# Patient Record
Sex: FEMALE | Race: WHITE | NOT HISPANIC OR LATINO | Employment: FULL TIME | URBAN - METROPOLITAN AREA
[De-identification: names, ages, dates, MRNs, and addresses within clinical notes are randomized per-mention and may not be internally consistent; named-entity substitution may affect disease eponyms.]

---

## 2024-02-08 NOTE — PROGRESS NOTES
Subjective   Patient ID:   [id number]   Sepideh Monroy is a 48 y.o. female who presents for Food Allergy (NPV, throat pain  and tongue swells when pt eats certain foods ).    Chief Complaint   Patient presents with    Food Allergy     NPV, throat pain  and tongue swells when pt eats certain foods         This is a new patient presenting virtually from home, unaccompanied, for tongue and throat edema.    Patient reports within about 8 months, after she consumed cooked squash, pumpkin and zucchini, and her throat felt funny.  She subsequently consumed cooked eggplant and green and red peppers and had similar Sx.  She also had issues with cucumbers and washes all her produce with vinegar.  She basically eat s lettuce and cheese because she is unsure of what else she can eat.    Patient has taken Benadryl longstanding for Sx with shellfish for several years.  She now fears she may progress to anaphylaxis.  Patient had COVID when it first started, then had the vaccine and feels her Sx may have started since then.  She was sick for a month but did not get tested.  She did experience anosmia and dysgeusia for 2-3 months.    Patient's son was found to haves as a teen requiring ED visit.  He was subsequently found to have a zucchini allergy despite having consumed it his entire life.    Patient denies any past or current seasonal allergies but will experience nasal congestion with dog and cat exposure.  She will have hives if they lick her arm.     Patient states aspirin causes hives.      Physical Exam  Constitutional:       Appearance: Normal appearance.   HENT:      Head: Normocephalic and atraumatic.   Eyes:      Extraocular Movements: Extraocular movements intact.      Conjunctiva/sclera: Conjunctivae normal.      Pupils: Pupils are equal, round, and reactive to light.   Pulmonary:      Effort: Pulmonary effort is normal.   Musculoskeletal:      Cervical back: Normal range of motion.   Skin:     Findings: No rash.    Neurological:      Mental Status: She is alert and oriented to person, place, and time. Mental status is at baseline.   Psychiatric:         Mood and Affect: Mood normal.         Behavior: Behavior normal.         Thought Content: Thought content normal.         Judgment: Judgment normal.       Assessment/Plan     Adverse food reaction  She is having symptoms to some high histamine foods and nightshades. I suspect that she has a sensitivity to salicylates. I have ordered immunocap to all of the foods she has reacted to in the past and a basofunction test to salicylates.    Shellfish allergy  Immunocap to shellfish. This will determine if she requires an epipen        By signing my name below, I, Hasmukh Yan, attest that this documentation has been prepared under the direction and in the presence of Cindy Stokes MD.  All medical record entries made by the Scribe were at my direction and personally dictated by me. I have reviewed the chart and agree that the record accurately reflects my personal performance of the history, physical exam, discussion and plan.

## 2024-02-09 ENCOUNTER — TELEMEDICINE (OUTPATIENT)
Dept: ALLERGY | Facility: CLINIC | Age: 49
End: 2024-02-09
Payer: COMMERCIAL

## 2024-02-09 DIAGNOSIS — T78.1XXA ADVERSE FOOD REACTION, INITIAL ENCOUNTER: Primary | ICD-10-CM

## 2024-02-09 PROCEDURE — 1036F TOBACCO NON-USER: CPT | Performed by: ALLERGY & IMMUNOLOGY

## 2024-02-09 PROCEDURE — 99204 OFFICE O/P NEW MOD 45 MIN: CPT | Performed by: ALLERGY & IMMUNOLOGY

## 2024-02-09 RX ORDER — METFORMIN HYDROCHLORIDE 500 MG/1
500 TABLET ORAL 2 TIMES DAILY
COMMUNITY

## 2024-02-09 RX ORDER — AMLODIPINE BESYLATE 5 MG/1
5 TABLET ORAL DAILY
COMMUNITY
Start: 2024-02-05

## 2024-02-09 RX ORDER — NITROFURANTOIN 25; 75 MG/1; MG/1
100 CAPSULE ORAL
COMMUNITY

## 2024-02-09 NOTE — PATIENT INSTRUCTIONS
Obtain blood work to evaluate food and shellfish as well as salicylate.  We will call you with results, recommendations and followup plan.    Consider reading about nightshade foods and salicylate intolerance.  If you do not want to have the salicylate test, let the lab know you do not want it done.

## 2024-02-12 PROBLEM — Z91.013 SHELLFISH ALLERGY: Status: ACTIVE | Noted: 2024-02-12

## 2024-02-13 NOTE — ASSESSMENT & PLAN NOTE
She is having symptoms to some high histamine foods and nightshades. I suspect that she has a sensitivity to salicylates. I have ordered immunocap to all of the foods she has reacted to in the past and a basofunction test to salicylates.

## 2024-02-16 ENCOUNTER — LAB (OUTPATIENT)
Dept: LAB | Facility: LAB | Age: 49
End: 2024-02-16
Payer: COMMERCIAL

## 2024-02-16 DIAGNOSIS — T78.1XXA ADVERSE FOOD REACTION, INITIAL ENCOUNTER: ICD-10-CM

## 2024-02-16 PROCEDURE — 82785 ASSAY OF IGE: CPT

## 2024-02-16 PROCEDURE — 86003 ALLG SPEC IGE CRUDE XTRC EA: CPT

## 2024-02-16 PROCEDURE — 36415 COLL VENOUS BLD VENIPUNCTURE: CPT

## 2024-02-17 LAB
CLAM IGE QN: <0.1 KU/L
CRAB IGE QN: <0.1 KU/L
LOBSTER IGE QN: <0.1 KU/L
OYSTER IGE QN: <0.1 KU/L
SCALLOP IGE QN: <0.1 KU/L
SHRIMP IGE QN: <0.1 KU/L
TOMATO IGE QN: <0.1 KU/L
TOTAL IGE SMQN RAST: 24.9 KU/L

## 2024-02-19 LAB
ANNOTATION COMMENT IMP: NORMAL
BLUE MUSSEL IGE QN: <0.1 KU/L
CUCUMBER IGE QN: <0.1 KU/L
EGGPLANT IGE QN: <0.1 KU/L

## 2024-02-21 LAB
SCAN RESULT: NORMAL
SUMMER SQUASH IGE QN: <0.35 KU/L

## 2024-02-22 LAB — SCAN RESULT: NORMAL

## 2024-02-23 LAB
Lab: 0
Lab: <0.1